# Patient Record
Sex: FEMALE | Race: WHITE
[De-identification: names, ages, dates, MRNs, and addresses within clinical notes are randomized per-mention and may not be internally consistent; named-entity substitution may affect disease eponyms.]

---

## 2017-04-13 ENCOUNTER — HOSPITAL ENCOUNTER (OUTPATIENT)
Dept: HOSPITAL 47 - LABWHC1 | Age: 32
Discharge: HOME | End: 2017-04-13
Payer: SELF-PAY

## 2017-04-13 DIAGNOSIS — Z3A.00: ICD-10-CM

## 2017-04-13 DIAGNOSIS — O20.9: Primary | ICD-10-CM

## 2017-04-13 PROCEDURE — 36415 COLL VENOUS BLD VENIPUNCTURE: CPT

## 2017-04-13 PROCEDURE — 84702 CHORIONIC GONADOTROPIN TEST: CPT

## 2017-04-15 ENCOUNTER — HOSPITAL ENCOUNTER (OUTPATIENT)
Dept: HOSPITAL 47 - LABWHC1 | Age: 32
End: 2017-04-15
Payer: SELF-PAY

## 2017-04-15 DIAGNOSIS — Z33.1: ICD-10-CM

## 2017-04-15 DIAGNOSIS — Z3A.00: ICD-10-CM

## 2017-04-15 DIAGNOSIS — O20.9: Primary | ICD-10-CM

## 2017-04-15 PROCEDURE — 84702 CHORIONIC GONADOTROPIN TEST: CPT

## 2017-04-15 PROCEDURE — 36415 COLL VENOUS BLD VENIPUNCTURE: CPT

## 2018-02-19 ENCOUNTER — HOSPITAL ENCOUNTER (INPATIENT)
Dept: HOSPITAL 47 - FBPOP | Age: 33
LOS: 1 days | Discharge: HOME | End: 2018-02-20
Payer: COMMERCIAL

## 2018-02-19 VITALS — RESPIRATION RATE: 18 BRPM

## 2018-02-19 VITALS — BODY MASS INDEX: 42 KG/M2

## 2018-02-19 DIAGNOSIS — Z87.891: ICD-10-CM

## 2018-02-19 DIAGNOSIS — Z3A.37: ICD-10-CM

## 2018-02-19 LAB
BASOPHILS # BLD AUTO: 0 K/UL (ref 0–0.2)
BASOPHILS NFR BLD AUTO: 0 %
EOSINOPHIL # BLD AUTO: 0.1 K/UL (ref 0–0.7)
EOSINOPHIL NFR BLD AUTO: 1 %
ERYTHROCYTE [DISTWIDTH] IN BLOOD BY AUTOMATED COUNT: 3.78 M/UL (ref 3.8–5.4)
ERYTHROCYTE [DISTWIDTH] IN BLOOD: 14 % (ref 11.5–15.5)
HCT VFR BLD AUTO: 32.6 % (ref 34–46)
HGB BLD-MCNC: 10.3 GM/DL (ref 11.4–16)
LYMPHOCYTES # SPEC AUTO: 3 K/UL (ref 1–4.8)
LYMPHOCYTES NFR SPEC AUTO: 26 %
MCH RBC QN AUTO: 27.2 PG (ref 25–35)
MCHC RBC AUTO-ENTMCNC: 31.6 G/DL (ref 31–37)
MCV RBC AUTO: 86.1 FL (ref 80–100)
MONOCYTES # BLD AUTO: 0.4 K/UL (ref 0–1)
MONOCYTES NFR BLD AUTO: 3 %
NEUTROPHILS # BLD AUTO: 7.8 K/UL (ref 1.3–7.7)
NEUTROPHILS NFR BLD AUTO: 69 %
PLATELET # BLD AUTO: 338 K/UL (ref 150–450)
WBC # BLD AUTO: 11.4 K/UL (ref 3.8–10.6)

## 2018-02-19 PROCEDURE — 84112 EVAL AMNIOTIC FLUID PROTEIN: CPT

## 2018-02-19 PROCEDURE — 99213 OFFICE O/P EST LOW 20 MIN: CPT

## 2018-02-19 PROCEDURE — 59025 FETAL NON-STRESS TEST: CPT

## 2018-02-19 PROCEDURE — 88307 TISSUE EXAM BY PATHOLOGIST: CPT

## 2018-02-19 PROCEDURE — 3E0R3NZ INTRODUCTION OF ANALGESICS, HYPNOTICS, SEDATIVES INTO SPINAL CANAL, PERCUTANEOUS APPROACH: ICD-10-PCS

## 2018-02-19 PROCEDURE — 85025 COMPLETE CBC W/AUTO DIFF WBC: CPT

## 2018-02-19 PROCEDURE — 00HU33Z INSERTION OF INFUSION DEVICE INTO SPINAL CANAL, PERCUTANEOUS APPROACH: ICD-10-PCS

## 2018-02-19 RX ADMIN — IBUPROFEN PRN MG: 600 TABLET ORAL at 10:00

## 2018-02-19 RX ADMIN — IBUPROFEN PRN MG: 600 TABLET ORAL at 16:29

## 2018-02-19 RX ADMIN — DOCUSATE SODIUM AND SENNOSIDES SCH: 50; 8.6 TABLET ORAL at 20:22

## 2018-02-19 RX ADMIN — ACETAMINOPHEN PRN MG: 325 TABLET, FILM COATED ORAL at 20:44

## 2018-02-19 NOTE — P.PROBDLV
Vaginal Delivery Note





- .


Vaginal Delivery Note: 





The patient progressed to complete dilation and then began pushing.  Infant's 

head came to a crown.  With one further push, the infant's head delivered 

across the perineum followed by the anterior shoulder.  Nose and mouth were 

bulb suctioned.  With one further push, the remainder the infant easily 

delivered and was placed on mother's abdomen.  Brisk cry was noted immediately.

  Cord was clamped and cut and infant was taken to warmer for evaluation.  A 

viable female infant was noted with Apgar scores of 9 at 1 minute and 9 at 5 

minutes and infant weight of 7 lbs. 1 oz.  Placenta delivered shortly thereafter

, intact, with a three-vessel cord.  Uterus contracted well after oxytocin was 

given and uterine massage was carried out.  Inspection of the perineum revealed 

no perineal lacerations.  Estimated blood loss is approximately 100 mL's.  Both 

mother and infant are in stable condition.

## 2018-02-19 NOTE — P.HPOB
History of Present Illness


H&P Date: 18


Chief Complaint: Contractions





This is a 32-year-old female  6 para 3 with an estimated date of 

confinement of 2018, estimated gestational age of 37-4/7 weeks who 

presents to labor and delivery with complaints of contractions that began 

approximate 1:30 AM.  Upon arrival during her vaginal check she had spontaneous 

rupture of membranes with clear fluid noted.  Prenatal care has been with Dr. Vasquez and has been uncomplicated per patient.





Prenatal labs:


GC/chlamydia-negative


Hepatitis B surface antigen-negative


Rubella-immune


Blood type-A+


Antibody screen-negative


RPR-nonreactive


HIV-nonreactive


Hemoglobin-10.9


Random glucose-80


Obstetrical ultrasound-normal anatomy


Group B streptococcus-negative





Obstetrical history: .  Her first pregnancy was delivered vaginally at 34 

weeks with premature rupture of membranes.  Her second pregnancy was a normal 

vaginal delivery at 36 weeks.  Her third pregnancy also was a normal vaginal 

delivery at 38 weeks.  She does have a history of 2 miscarriages following 

those deliveries.











Review of Systems


Constitutional: Denies chills, Denies fever


Eyes: denies blurred vision, denies pain


Cardiovascular: Denies chest pain, Denies shortness of breath


Gastrointestinal: Reports abdominal pain (Contractions)


Genitourinary: Reports pelvic pain, Reports pregnant


Musculoskeletal: Reports low back pain


Integumentary: Denies pruritus, Denies rash


Neurological: Denies numbness, Denies weakness





Past Medical History


Past Medical History: No Reported History


History of Any Multi-Drug Resistant Organisms: None Reported


Past Surgical History: No Surgical Hx Reported


Past Anesthesia/Blood Transfusion Reactions: No Reported Reaction


Past Psychological History: No Psychological Hx Reported


Smoking Status: Former smoker


Past Alcohol Use History: None Reported


Past Drug Use History: None Reported





- Past Family History


  ** Mother


Family Medical History: No Reported History





Medications and Allergies


 Home Medications











 Medication  Instructions  Recorded  Confirmed  Type


 


No Known Home Medications [No  18 History





Known Home Medications]    











 Allergies











Allergy/AdvReac Type Severity Reaction Status Date / Time


 


No Known Allergies Allergy   Verified 18 03:01














Exam


Osteopathic Statement: *.  No significant issues noted on an osteopathic 

structural exam other than those noted in the History and Physical/Consult.





- Vital Signs


Vital signs: 


 Vital Signs











  Temp Pulse Resp BP


 


 18 03:18  97.7 F  96  16  118/73








 Intake and Output











 18





 22:59 06:59 14:59


 


Other:   


 


  # Voids  1 


 


  Weight  111.13 kg 














HEENT: Within normal limits


Heart: Regular rate and rhythm


Lungs: Clear to auscultation bilaterally


Abdomen: 


Cervix: Upon my check is 7 cm/90%/-1 station, grossly ruptured clear fluid


Fetal heart tones are reactive


Contractions: Every 2-3 minutes


Extremities: Negative Homans





Results


Result Diagrams: 


 18 03:50





 Abnormal Lab Results - Last 24 Hours (Table)











  18 Range/Units





  03:50 


 


WBC  11.4 H  (3.8-10.6)  k/uL


 


RBC  3.78 L  (3.80-5.40)  m/uL


 


Hgb  10.3 L  (11.4-16.0)  gm/dL


 


Hct  32.6 L  (34.0-46.0)  %


 


Neutrophils #  7.8 H  (1.3-7.7)  k/uL














Assessment and Plan


(1) 37 weeks gestation of pregnancy


Current Visit: Yes   Status: Acute   Code(s): Z3A.37 - 37 WEEKS GESTATION OF 

PREGNANCY   SNOMED Code(s): 40987767


   





(2) Spontaneous rupture of membranes


Current Visit: Yes   Status: Acute   Code(s): XQQ9515 -    SNOMED Code(s): 

002370884


   


Plan: 





Admission for active labor.  Expectant management.

## 2018-02-20 VITALS — HEART RATE: 66 BPM | SYSTOLIC BLOOD PRESSURE: 104 MMHG | TEMPERATURE: 98.4 F | DIASTOLIC BLOOD PRESSURE: 66 MMHG

## 2018-02-20 LAB
BASOPHILS # BLD AUTO: 0.1 K/UL (ref 0–0.2)
BASOPHILS NFR BLD AUTO: 1 %
EOSINOPHIL # BLD AUTO: 0.1 K/UL (ref 0–0.7)
EOSINOPHIL NFR BLD AUTO: 1 %
ERYTHROCYTE [DISTWIDTH] IN BLOOD BY AUTOMATED COUNT: 3.38 M/UL (ref 3.8–5.4)
ERYTHROCYTE [DISTWIDTH] IN BLOOD: 13.9 % (ref 11.5–15.5)
HCT VFR BLD AUTO: 29.4 % (ref 34–46)
HGB BLD-MCNC: 9.3 GM/DL (ref 11.4–16)
LYMPHOCYTES # SPEC AUTO: 2.5 K/UL (ref 1–4.8)
LYMPHOCYTES NFR SPEC AUTO: 22 %
MCH RBC QN AUTO: 27.6 PG (ref 25–35)
MCHC RBC AUTO-ENTMCNC: 31.7 G/DL (ref 31–37)
MCV RBC AUTO: 86.9 FL (ref 80–100)
MONOCYTES # BLD AUTO: 0.4 K/UL (ref 0–1)
MONOCYTES NFR BLD AUTO: 4 %
NEUTROPHILS # BLD AUTO: 8.3 K/UL (ref 1.3–7.7)
NEUTROPHILS NFR BLD AUTO: 73 %
PLATELET # BLD AUTO: 265 K/UL (ref 150–450)
WBC # BLD AUTO: 11.4 K/UL (ref 3.8–10.6)

## 2018-02-20 RX ADMIN — ACETAMINOPHEN PRN MG: 325 TABLET, FILM COATED ORAL at 09:26

## 2018-02-20 RX ADMIN — IBUPROFEN PRN MG: 600 TABLET ORAL at 01:34

## 2018-02-20 RX ADMIN — DOCUSATE SODIUM AND SENNOSIDES SCH: 50; 8.6 TABLET ORAL at 08:03

## 2018-02-20 NOTE — P.DS
Providers


Date of admission: 


02/19/18 03:13





Expected date of discharge: 02/20/18


Attending physician: 


Zara Vasquez





Primary care physician: 


Stated None








- Discharge Diagnosis(es)


(1) Normal vaginal delivery


Current Visit: Yes   Status: Acute   


Hospital Course: 





Patient presented in active labor.  She underwent normal vaginal delivery.  Her 

postpartum course uncomplicated.  She will be discharged home postpartum day #1 

in stable condition to follow-up with me in 6 weeks.





Plan - Discharge Summary


New Discharge Prescriptions: 


New


   Ibuprofen [Motrin] 600 mg PO Q6HR PRN #30 tab


     PRN Reason: Mild Pain Or Fever >= 100.5


Discharge Medication List





Ibuprofen [Motrin] 600 mg PO Q6HR PRN #30 tab 02/20/18 [Rx]








Follow up Appointment(s)/Referral(s): 


Zara Vasquez DO [Doctor of Osteopathic Medicine] - 6 Weeks


Discharge Disposition: HOME SELF-CARE